# Patient Record
Sex: MALE | Race: WHITE | NOT HISPANIC OR LATINO
[De-identification: names, ages, dates, MRNs, and addresses within clinical notes are randomized per-mention and may not be internally consistent; named-entity substitution may affect disease eponyms.]

---

## 2021-03-30 PROBLEM — Z00.00 ENCOUNTER FOR PREVENTIVE HEALTH EXAMINATION: Status: ACTIVE | Noted: 2021-03-30

## 2021-04-06 ENCOUNTER — APPOINTMENT (OUTPATIENT)
Dept: SURGERY | Facility: CLINIC | Age: 61
End: 2021-04-06

## 2021-04-06 VITALS
TEMPERATURE: 98.1 F | SYSTOLIC BLOOD PRESSURE: 124 MMHG | DIASTOLIC BLOOD PRESSURE: 87 MMHG | WEIGHT: 150 LBS | BODY MASS INDEX: 24.99 KG/M2 | HEART RATE: 87 BPM | HEIGHT: 65 IN | OXYGEN SATURATION: 96 %

## 2021-04-06 DIAGNOSIS — Z78.9 OTHER SPECIFIED HEALTH STATUS: ICD-10-CM

## 2021-06-09 ENCOUNTER — EMERGENCY (EMERGENCY)
Facility: HOSPITAL | Age: 61
LOS: 1 days | Discharge: HOME | End: 2021-06-09
Attending: EMERGENCY MEDICINE
Payer: COMMERCIAL

## 2021-06-09 VITALS
TEMPERATURE: 98 F | OXYGEN SATURATION: 97 % | RESPIRATION RATE: 22 BRPM | HEART RATE: 91 BPM | DIASTOLIC BLOOD PRESSURE: 79 MMHG | SYSTOLIC BLOOD PRESSURE: 131 MMHG | HEIGHT: 66 IN | WEIGHT: 154.98 LBS

## 2021-06-09 VITALS
TEMPERATURE: 98 F | HEART RATE: 100 BPM | DIASTOLIC BLOOD PRESSURE: 85 MMHG | SYSTOLIC BLOOD PRESSURE: 136 MMHG | RESPIRATION RATE: 18 BRPM | OXYGEN SATURATION: 95 %

## 2021-06-09 DIAGNOSIS — S82.209A UNSPECIFIED FRACTURE OF SHAFT OF UNSPECIFIED TIBIA, INITIAL ENCOUNTER FOR CLOSED FRACTURE: Chronic | ICD-10-CM

## 2021-06-09 DIAGNOSIS — S82.409A UNSPECIFIED FRACTURE OF SHAFT OF UNSPECIFIED FIBULA, INITIAL ENCOUNTER FOR CLOSED FRACTURE: Chronic | ICD-10-CM

## 2021-06-09 DIAGNOSIS — Z98.890 OTHER SPECIFIED POSTPROCEDURAL STATES: Chronic | ICD-10-CM

## 2021-06-09 PROCEDURE — 73030 X-RAY EXAM OF SHOULDER: CPT

## 2021-06-09 PROCEDURE — 73020 X-RAY EXAM OF SHOULDER: CPT

## 2021-06-09 PROCEDURE — 71250 CT THORAX DX C-: CPT | Mod: 26,MA

## 2021-06-09 PROCEDURE — 99285 EMERGENCY DEPT VISIT HI MDM: CPT

## 2021-06-09 PROCEDURE — 73030 X-RAY EXAM OF SHOULDER: CPT | Mod: 26,RT

## 2021-06-09 PROCEDURE — 25605 CLTX DST RDL FX/EPHYS SEP W/: CPT | Mod: RT

## 2021-06-09 PROCEDURE — 73110 X-RAY EXAM OF WRIST: CPT | Mod: 26,RT

## 2021-06-09 PROCEDURE — 73100 X-RAY EXAM OF WRIST: CPT | Mod: 26,RT,59

## 2021-06-09 PROCEDURE — 73070 X-RAY EXAM OF ELBOW: CPT | Mod: 26,RT

## 2021-06-09 PROCEDURE — 73090 X-RAY EXAM OF FOREARM: CPT

## 2021-06-09 PROCEDURE — 73110 X-RAY EXAM OF WRIST: CPT

## 2021-06-09 PROCEDURE — 99285 EMERGENCY DEPT VISIT HI MDM: CPT | Mod: 25

## 2021-06-09 PROCEDURE — 71250 CT THORAX DX C-: CPT

## 2021-06-09 PROCEDURE — 73090 X-RAY EXAM OF FOREARM: CPT | Mod: 26,LT

## 2021-06-09 PROCEDURE — 73070 X-RAY EXAM OF ELBOW: CPT

## 2021-06-09 PROCEDURE — 73100 X-RAY EXAM OF WRIST: CPT

## 2021-06-09 RX ORDER — IBUPROFEN 200 MG
400 TABLET ORAL ONCE
Refills: 0 | Status: COMPLETED | OUTPATIENT
Start: 2021-06-09 | End: 2021-06-09

## 2021-06-09 RX ORDER — KETOROLAC TROMETHAMINE 30 MG/ML
15 SYRINGE (ML) INJECTION ONCE
Refills: 0 | Status: DISCONTINUED | OUTPATIENT
Start: 2021-06-09 | End: 2021-06-09

## 2021-06-09 RX ORDER — ACETAMINOPHEN 500 MG
650 TABLET ORAL ONCE
Refills: 0 | Status: COMPLETED | OUTPATIENT
Start: 2021-06-09 | End: 2021-06-09

## 2021-06-09 RX ADMIN — Medication 400 MILLIGRAM(S): at 17:36

## 2021-06-09 RX ADMIN — Medication 650 MILLIGRAM(S): at 16:52

## 2021-06-09 RX ADMIN — Medication 650 MILLIGRAM(S): at 15:06

## 2021-06-09 RX ADMIN — Medication 400 MILLIGRAM(S): at 18:30

## 2021-06-09 NOTE — ED PROVIDER NOTE - PROGRESS NOTE DETAILS
ap- pt signed out to me pending results of imaging, pt states that he was on the top on the 12 ft ladder and he lost his balance, and then landed w/ the R arm straight and now has pain the R upper shouder and R arm, pt is R hand dominant, pain in the superficial anterior shoulder area, concerning for distal radius fracture, discussion w/ wife regarding findings spoke w orthopedic resident, Dr Mckenzie,  who reports that spoke w orthopedic resident, Dr Mckenzie,  who reports that if pt w/ improvement in parethesias ok to dc home, pt reevaluated pt w/ persistent parethesias in the fingertips on the 1-3 fingers, ap- spoke w/ patient regarding findings, pt requesting orthopedics to reassess ap- spoke w/ patient regarding findings, pt reports improvement in parethesias and is requesting discharge home at this time attempted to call wife, no response

## 2021-06-09 NOTE — ED PROVIDER NOTE - PATIENT PORTAL LINK FT
You can access the FollowMyHealth Patient Portal offered by [facility] by registering at the following website: [email or web address]. By joining [employer]’s FollowMyHealth portal, you will also be able to view your health information using other applications (apps) compatible with our system.

## 2021-06-09 NOTE — ED PROVIDER NOTE - CLINICAL SUMMARY MEDICAL DECISION MAKING FREE TEXT BOX
60 Yr old male h/o fall from 12 feet to right outstretched hand and right back. Per patient has complaints to right wrist and right thoracic body wall. VS stable. PE wnl. Labs, X-rays, Analgesics.

## 2021-06-09 NOTE — ED PROVIDER NOTE - OBJECTIVE STATEMENT
60 Yr old male h/o fall from 12 feet to right outstretched hand and back.   Per patient has complaints to right wrist and right thoracic body wall.   No other apparent injuries, No LOC.

## 2021-06-09 NOTE — ED PROVIDER NOTE - NS ED ROS FT

## 2021-06-09 NOTE — CONSULT NOTE ADULT - SUBJECTIVE AND OBJECTIVE BOX
Patient is a 60yMale RHD who presents to [facility] ED w/ a c/o of Right wrist and shoulder pain after falling from 12 ft ladder. Patient states he was placing fixtures at his sons home when he lost balanc fell and tried to stop his fall with this hand, felt immediate pain, denies preceding CP/SOB/headache/confusion/dizziness/palitations/weakness/fatigue. Denies Head trauma/LOC. States ability to walk immediately following the injury. Denies any numbness or tingling. Denies having any other pain elsewhere. No other orthopedic concerns at this time.      Pneumothorax on left    Horners syndrome    No pertinent past medical history    Unilateral inguinal hernia without obstruction or gangrene, recurrence not specified            Vicodin (Pruritus)      PHYSICAL EXAM:  T(C): 36.6 (06-09-21 @ 20:18), Max: 36.6 (06-09-21 @ 20:18)  HR: 100 (06-09-21 @ 20:18) (91 - 100)  BP: 136/85 (06-09-21 @ 20:18) (131/79 - 136/85)  RR: 18 (06-09-21 @ 20:18) (18 - 22)  SpO2: 95% (06-09-21 @ 20:18) (95% - 97%)    Gen: NAD, Resting comfortably    RIGHT Upper Extremity:   Skin intact, mild deformity and swelling of the distal radius  TTP over the bony prominences of the wrist/shoulder  Painful passive/active ROM of the shoulder/wrist, non painful other joints of the extremity  C5-T1 SILT  Motor grossly intact throughout axillary/musculocutaneous/radial/median/ulnar/AIN/PIN nerves  + radial pulse  Compartments soft and compressible    Secondary Survey:   No TTP over bony prominences, SILT, palpable pulses, full/painless A/PROM, compartments soft. No TTP over spinous processes or paraspinal muscles at C/T/L spine. No palpable step off. No other injuries or complaints.      Procedure:   Closed reduction was performed and a well molded, well padded plaster splint was applied. After reduction the patient reported some tingling in the first 3 digits of the hand. The ace and splint was loosened and the extremity elevated and ice was placed. The patient reported resolution of his symptoms and was discharged by the emergency department.  No motor weakness post reduction.  Post-reduction xrays demonstrated acceptable alignment.

## 2021-06-09 NOTE — ED PROVIDER NOTE - CARE PLAN
Principal Discharge DX:	Closed fracture of right wrist, initial encounter  Goal:	Acute dorsally impacted distal radial metaphyseal fracture with intra-articular involvement including lunate fossa of distal radial articular surface and portion of radial styloid  Assessment and plan of treatment:	Acute dorsally impacted distal radial metaphyseal fracture with intra-articular involvement including lunate fossa of distal radial articular surface and portion of radial styloid

## 2021-06-09 NOTE — ED PROVIDER NOTE - NSFOLLOWUPCLINICS_GEN_ALL_ED_FT
[facility]  Orthopedic Surgery  [address] [place] [address]  Phone: [phone]  Fax:     [facility]  Orthopedic Surgery  [address]  [place] [address]  Phone: [phone]  Fax:     [facility]  Orthopedic Surgery  [address], 3rd & 4th [place] [address]  Phone: [phone]  Fax:     [facility]  Orthopedic Surgery  [address]  [place] [address]  Phone: [phone]  Fax:     [facility]  Orthopedic Surgery  [address] [place] [address]  Phone: [phone]  Fax:     [facility]  Orthopedic Surgery  [address] [place] [address]  Phone: [phone]  Fax:

## 2021-06-09 NOTE — ED ADULT NURSE NOTE - OBJECTIVE STATEMENT
60M to ED s/p fall from 12 foot ladder, landing on R arm. Patient c/o pain to R arm and R wrist, also R upper back to the R of his spine. Patient c/o difficulty taking a deep breath since the fall.  Patient denies hitting his head, denies LOC, and denies use of blood thinners. Patient is alert and oriented x4, calm/cooperative, NAD, VSS. No labs or IV at this time as per Dr Walls at this time.

## 2021-06-09 NOTE — ED PROVIDER NOTE - PMH
Horners syndrome  Nov 1999  Pneumothorax on left  Nov 27, 1999  Unilateral inguinal hernia without obstruction or gangrene, recurrence not specified

## 2021-06-09 NOTE — ED PROVIDER NOTE - ATTENDING CONTRIBUTION TO CARE
Mechanical fall approximately 12 feet off ladder, caught weight with R hand/wrist.  No head trauma or LOC.  No loss of sensation in R hand.  Also reporting pain in R posterior chest wall.  Ambulatory after the fact.  Not on blood thinners.    A 14 point review of systems is negative except as in HPI or otherwise documented.    Exam:  General: Patient well appearing, vital signs within normal limits  HEENT: airway patent with moist mucous membranes  Cardiac: RRR S1/S2 with strong peripheral pulses  Respiratory: lungs clear without respiratory distress, R posterior chest wall point tenderness in periscapular area without palpable crepitus or deformity  GI: abdomen soft, non tender, non distended  Neuro: no gross neurologic deficits  MSK: no midline spinal tenderness, edema of R distal wrist with decreased ROM due to pain  Skin: warm, well perfused  Psych: normal mood and affect    Suspect R wrist fracture, will obtain plain films, will also obtain CT chest to r/o concomitant rib fractures, pain control

## 2021-06-09 NOTE — ED PROVIDER NOTE - PSH
Fibula fracture  left 1999  Status post inguinal hernia repair  Right 2014  Tibial fracture  left 1999

## 2021-06-09 NOTE — ED ADULT NURSE NOTE - NSIMPLEMENTINTERV_GEN_ALL_ED
Implemented All Fall Risk Interventions:  [place] to call system. Call bell, personal items and telephone within reach. Instruct patient to call for assistance. Room bathroom lighting operational. Non-slip footwear when patient is off stretcher. Physically safe environment: no spills, clutter or unnecessary equipment. Stretcher in lowest position, wheels locked, appropriate side rails in place. Provide visual cue, wrist band, yellow gown, etc. Monitor gait and stability. Monitor for mental status changes and reorient to person, place, and time. Review medications for side effects contributing to fall risk. Reinforce activity limits and safety measures with patient and family.

## 2021-06-09 NOTE — ED PROVIDER NOTE - CARE PROVIDER_API CALL
Mark Yu)  Orthopaedic Surgery  [address] [place] [address], [address]  [place] [address]  Phone: [phone]  Fax: [phone]  Follow Up Time:

## 2021-06-09 NOTE — ED PROVIDER NOTE - NSFOLLOWUPINSTRUCTIONS_ED_ALL_ED_FT
We evaluated you in the emergency room and it appears that you have a wrist fracture.     We recommend that you rest, elevate, ice and take tylenol(650 mg up to three times a day)/motrin(600 mg up to three times a day) for your symptoms.     Please follow up with orthopedics in the next 1-4 days for your symptoms.   If you develop numbness, weakness, change in color of your extremities (turn blue or white), worsening pain please seek immediate medical care for repeat evaluation.

## 2021-06-09 NOTE — ED PROVIDER NOTE - NSFOLLOWUPCLINICSTOKEN_GEN_ALL_ED_FT
[id number]: || ||00\01||False;[id number]: || ||00\01||False;[id number]: || ||00\01||False;[id number]: || ||00\01||False;[id number]: || ||00\01||False;[id number]: || ||00\01||False;

## 2021-06-09 NOTE — ED PROVIDER NOTE - PLAN OF CARE
Acute dorsally impacted distal radial metaphyseal fracture with intra-articular involvement including lunate fossa of distal radial articular surface and portion of radial styloid

## 2021-06-09 NOTE — CONSULT NOTE ADULT - ASSESSMENT
A/P: 60M w/ R Distal Radius Fx    Case discussed and reviewed with Dr Yu  Images show Right dorsally angulated extra articular distal radius fx.   Pt reduced and placed in sugar tong splint, post reduction images were taken.  The signs and symptoms compartment syndromes were explained to patient  Called patient at 2300 6/9/21 to FU parasthesias which he said have still resolved. The is aware to seek emergency care for persistent parasthesias in the medial distribution.   The patient understood they should seek care if encounters these symptoms  No immediate need for ortho surgical intervention, but pt made aware of possible need for surgical intervention in the future.   Analgesia prn  NWB RUE in sugar tong splint and sling  DVT ppx if decreased ambulation  PT/OT as tolerated, further instructions in office  Ice and elevate, stressed the importance to patient  Orthopedically stable for discharge w/ plan to FU outpatient w/ Dr Yu, call office for apt.   Discussed with attending who is in agreement with above plan

## 2021-06-09 NOTE — ED PROVIDER NOTE - PHYSICAL EXAMINATION
PHYSICAL EXAM:'    GENERAL: NAD, lying in bed comfortably  HEAD:  Atraumatic, Normocephalic  EYES: EOMI, PERRLA, conjunctiva and sclera clear  ENT: No erythema/pallor/petechiae/lesions; TMs clear b/l  NECK: Supple, No JVD  LUNG: CTA b/l; no r/r/w  HEART: RRR, +S1/S2; No m/r/g  ABDOMEN: soft, NT/ND; BS audible   EXTREMITIES:  2+ Peripheral Pulses, brisk cap refill. No clubbing, cyanosis, or edema  NERVOUS SYSTEM:  AAOx3, speech clear. No sensory/motor deficits   MSK: Right wrist pain.   SKIN: No rashes or lesions